# Patient Record
Sex: MALE | Race: WHITE | NOT HISPANIC OR LATINO | Employment: OTHER | URBAN - NONMETROPOLITAN AREA
[De-identification: names, ages, dates, MRNs, and addresses within clinical notes are randomized per-mention and may not be internally consistent; named-entity substitution may affect disease eponyms.]

---

## 2023-09-18 ENCOUNTER — OFFICE VISIT (OUTPATIENT)
Dept: NEUROSURGERY | Facility: CLINIC | Age: 73
End: 2023-09-18
Payer: MEDICARE

## 2023-09-18 ENCOUNTER — APPOINTMENT (OUTPATIENT)
Dept: OTHER | Facility: HOSPITAL | Age: 73
End: 2023-09-18
Payer: MEDICARE

## 2023-09-18 ENCOUNTER — HOSPITAL ENCOUNTER (OUTPATIENT)
Dept: GENERAL RADIOLOGY | Facility: HOSPITAL | Age: 73
Discharge: HOME | End: 2023-09-18
Payer: MEDICARE

## 2023-09-18 VITALS — BODY MASS INDEX: 43.52 KG/M2 | WEIGHT: 304 LBS | HEIGHT: 70 IN

## 2023-09-18 DIAGNOSIS — E66.01 MORBIDLY OBESE: ICD-10-CM

## 2023-09-18 DIAGNOSIS — F17.200 SMOKER: ICD-10-CM

## 2023-09-18 DIAGNOSIS — M51.36 LUMBAR DEGENERATIVE DISC DISEASE: Primary | ICD-10-CM

## 2023-09-18 PROBLEM — M51.369 LUMBAR DEGENERATIVE DISC DISEASE: Status: ACTIVE | Noted: 2023-09-18

## 2023-09-18 PROCEDURE — 1159F MED LIST DOCD IN RCRD: CPT | Performed by: PHYSICIAN ASSISTANT

## 2023-09-18 PROCEDURE — 1160F RVW MEDS BY RX/DR IN RCRD: CPT | Performed by: PHYSICIAN ASSISTANT

## 2023-09-18 PROCEDURE — 99204 OFFICE O/P NEW MOD 45 MIN: CPT | Performed by: PHYSICIAN ASSISTANT

## 2023-09-18 PROCEDURE — 72114 X-RAY EXAM L-S SPINE BENDING: CPT

## 2023-09-18 RX ORDER — PRENATAL VIT/IRON FUM/FOLIC AC 27MG-0.8MG
1 TABLET ORAL DAILY
COMMUNITY

## 2023-09-18 RX ORDER — POTASSIUM CHLORIDE 750 MG/1
10 TABLET, EXTENDED RELEASE ORAL DAILY
COMMUNITY

## 2023-09-18 RX ORDER — LIDOCAINE 50 MG/G
1 PATCH TOPICAL EVERY 24 HOURS
Qty: 30 PATCH | Refills: 0 | Status: SHIPPED | OUTPATIENT
Start: 2023-09-18

## 2023-09-18 RX ORDER — FUROSEMIDE 40 MG/1
40 TABLET ORAL 4 TIMES DAILY
COMMUNITY

## 2023-09-18 RX ORDER — PEN NEEDLE, DIABETIC 31 GX3/16"
NEEDLE, DISPOSABLE MISCELLANEOUS
COMMUNITY
Start: 2023-07-14

## 2023-09-18 RX ORDER — SILDENAFIL 100 MG/1
50 TABLET, FILM COATED ORAL AS NEEDED
COMMUNITY

## 2023-09-18 RX ORDER — TOLTERODINE 4 MG/1
4 CAPSULE, EXTENDED RELEASE ORAL DAILY
COMMUNITY
Start: 2023-05-02

## 2023-09-18 RX ORDER — ORAL SEMAGLUTIDE 14 MG/1
1 TABLET ORAL DAILY
COMMUNITY

## 2023-09-18 RX ORDER — ROSUVASTATIN CALCIUM 40 MG/1
40 TABLET, COATED ORAL DAILY
COMMUNITY

## 2023-09-18 RX ORDER — ALLOPURINOL 300 MG/1
300 TABLET ORAL DAILY
COMMUNITY

## 2023-09-18 RX ORDER — ACETAMINOPHEN 325 MG/1
650 TABLET ORAL EVERY 6 HOURS PRN
COMMUNITY

## 2023-09-18 RX ORDER — LANOLIN ALCOHOL/MO/W.PET/CERES
150 CREAM (GRAM) TOPICAL DAILY
COMMUNITY

## 2023-09-18 RX ORDER — GABAPENTIN 600 MG/1
600 TABLET ORAL 3 TIMES DAILY
COMMUNITY
Start: 2023-07-07

## 2023-09-18 RX ORDER — AMLODIPINE AND VALSARTAN 10; 320 MG/1; MG/1
1 TABLET ORAL DAILY
COMMUNITY

## 2023-09-18 RX ORDER — ORPHENADRINE CITRATE 100 MG/1
100 TABLET, EXTENDED RELEASE ORAL 2 TIMES DAILY PRN
COMMUNITY
Start: 2023-06-19

## 2023-09-18 RX ORDER — MELATONIN
1 DAILY
COMMUNITY

## 2023-09-18 RX ORDER — CARVEDILOL 6.25 MG/1
6.25 TABLET ORAL 2 TIMES DAILY WITH MEALS
COMMUNITY
Start: 2023-09-18

## 2023-09-18 RX ORDER — INSULIN LISPRO 100 [IU]/ML
INJECTION, SOLUTION INTRAVENOUS; SUBCUTANEOUS
COMMUNITY
Start: 2023-06-01

## 2023-09-18 RX ORDER — HYDROCODONE BITARTRATE AND ACETAMINOPHEN 10; 325 MG/1; MG/1
1 TABLET ORAL
COMMUNITY
Start: 2023-06-19

## 2023-09-18 NOTE — PATIENT INSTRUCTIONS
Recommend trial lumbar facet injections with possible ablation  Continue meds as current with trial Lidocaine patches

## 2023-09-18 NOTE — PROGRESS NOTES
"    Chief complaint:   Chief Complaint   Patient presents with    Back Pain     Low back pain saw Dr. Bashir about a year ago and he sent him to pain management       Subjective     HPI:   Ezra comes in today as a self-referral with complaints of chronic back pain.  His pain is located in the lower lumbar region and his pain level is associated with his activity level for that day.  He denies any radiating pain to the lower extremities as well as neurogenic claudication symptoms.  He does have longstanding paresthesias affecting both feet secondary to diabetic neuropathy.  He is established in pain management with Dr. Rodgers.  He is scheduled for an upcoming LESI.  Last LESI was 4 months ago and provided 2 months of symptom relief.  He indicates that during the winter months when he is less active, the injections typically last about 3 months.  He takes Norco very sparingly but does take gabapentin routinely and has found that CBD Gummies work quite well.  He has not had any recent physical therapy.  He denies loss of bowel and bladder control as well as saddle anesthesia.    Review of Systems     Past Medical History:   Diagnosis Date    Arthritis     DM (diabetes mellitus)     HTN (hypertension)     Hx-TIA (transient ischemic attack)     Kidney disease      Past Surgical History:   Procedure Laterality Date    BACK SURGERY  2009    [facility]    CHOLECYSTECTOMY      KNEE SURGERY       History reviewed. No pertinent family history.  Social History     Tobacco Use    Smoking status: Some Days     Types: Cigarettes, Cigars    Smokeless tobacco: Never   Substance Use Topics    Alcohol use: Yes     Comment: 1-2 drinks a week    Drug use: Never     (Not in a hospital admission)    Allergies:  Dulaglutide    Objective      Vital Signs  Ht 177.8 cm (70\")   Wt (!) 138 kg (304 lb)   BMI 43.62 kg/m²     Physical Exam  Constitutional:       Appearance: He is well-developed. He is obese.   HENT:      Head: Normocephalic. "   Eyes:      General: Lids are normal.      Conjunctiva/sclera: Conjunctivae normal.      Pupils: Pupils are equal, round, and reactive to light.   Pulmonary:      Effort: Pulmonary effort is normal.      Breath sounds: Normal breath sounds.   Musculoskeletal:         General: Tenderness (Bilateral knees.) present.      Cervical back: Normal range of motion.   Skin:     General: Skin is warm.   Neurological:      Mental Status: He is alert and oriented to person, place, and time.      GCS: GCS eye subscore is 4. GCS verbal subscore is 5. GCS motor subscore is 6.      Cranial Nerves: No cranial nerve deficit.      Sensory: No sensory deficit.      Motor: Motor strength is normal.      Deep Tendon Reflexes: Reflexes are normal and symmetric. Reflexes normal.   Psychiatric:         Speech: Speech normal.         Behavior: Behavior normal.         Thought Content: Thought content normal.       Neurologic Exam     Mental Status   Oriented to person, place, and time.   Speech: speech is normal   Level of consciousness: alert    Cranial Nerves     CN III, IV, VI   Pupils are equal, round, and reactive to light.    Motor Exam   Muscle bulk: normal  Overall muscle tone: normal    Strength   Strength 5/5 throughout.     Sensory Exam   Light touch normal.     Gait, Coordination, and Reflexes     Gait  Gait: non-neurologicGait is stable with cane.  Demonstrates start up stiffness with rising from seated position.     Imaging review: MRI of the lumbar spine from outside facility dated 8/23/2006 was reviewed and demonstrates chronic compression deformity of L1.  There is mild to moderate disc degeneration L5-S1 with prior laminectomy changes.  There is paracentral disc protrusion with facet ligament hypertrophy.  No significant central stenosis there is severe right and moderate left neuroforaminal stenosis at L5-S1.  No large disc herniations or central stenosis throughout.        Assessment/Plan: I reviewed MRI findings in  detail with the patient and his wife.  He does have disc degeneration at L5-S1 and severe neuroforaminal stenosis on the right but does not have any radicular pain.  Complains of axial back pain.  He is neurologically stable.  Before considering any kind of surgery, I would like for him to try lumbar facet injections with possible RFA.  He is established with Dr. Mathews so we will get him set up L4-S1 procedure bilaterally.  I would also like to initiate 6 weeks of physical therapy to focus on lumbar core strengthening and stretching.  We will initiate a trial of lidocaine patches to see if that will help with his pain.  Discussed the benefits of weight loss as well as avoidance of aggravating activities and protective body mechanics.  We will get plain images today on his way out to evaluate for any instability.  He will follow-up in approximately 4 months with Dr. Bashir.    I advised the patient to call and return sooner for new or worsening complaints of weakness, paresthesias, gait disturbances, or any additional concerns.  Treatment options discussed in detail with Ezra and the patient voiced understanding.  Mr. Bashir agrees with this plan of care.     Patient is a smoker. Smoking cessation classes given to the patient    The patient's Body mass index is 43.62 kg/m².. BMI is above normal parameters. Recommendations include: educational material    ADVANCED CARE PLANNING  Information on advance directives provided in AVS.    DESTINY Fall Risk Assessment was completed, and patient is at MODERATE risk for falls. Assessment completed on:9/18/2023       Diagnoses and all orders for this visit:    1. Lumbar degenerative disc disease (Primary)  -     XR Spine Lumbar Complete With Flex & Ext  -     Ambulatory Referral to Physical Therapy Evaluate and treat, Neuro; Strengthening, ROM, Stretching; Full weight bearing  -     lidocaine (LIDODERM) 5 %; Place 1 patch on the skin as directed by provider Daily. Remove &  Discard patch within 12 hours or as directed by MD  Dispense: 30 patch; Refill: 0  -     Ambulatory Referral to Pain Management Clinic    2. Smoker    3. Morbidly obese          I discussed the patients findings and my recommendations with patient    Maliha Peralta PA-C  09/18/23  13:46 CDT

## 2024-02-21 ENCOUNTER — OFFICE VISIT (OUTPATIENT)
Dept: NEUROSURGERY | Facility: CLINIC | Age: 74
End: 2024-02-21
Payer: MEDICARE

## 2024-02-21 VITALS — WEIGHT: 293 LBS | BODY MASS INDEX: 41.95 KG/M2 | HEIGHT: 70 IN

## 2024-02-21 DIAGNOSIS — E66.01 CLASS 3 SEVERE OBESITY DUE TO EXCESS CALORIES WITH SERIOUS COMORBIDITY AND BODY MASS INDEX (BMI) OF 40.0 TO 44.9 IN ADULT: ICD-10-CM

## 2024-02-21 DIAGNOSIS — M51.36 LUMBAR DEGENERATIVE DISC DISEASE: Primary | ICD-10-CM

## 2024-02-21 DIAGNOSIS — F17.200 SMOKER: ICD-10-CM

## 2024-02-21 NOTE — PROGRESS NOTES
"    Chief complaint:   Chief Complaint   Patient presents with    Follow-up     Follow up low back pain        Subjective     HPI: I had a chance to see Ezra today in follow-up.  He is doing very well as far as his back is concerned following his epidural steroid injection and he really has very minimal back pain at this time.  His main issue now is getting his knees done and he is currently working on losing some weight so that he can have the procedures done.    Review of Systems      Objective      Vital Signs  Ht 177.8 cm (70\")   Wt 133 kg (293 lb)   BMI 42.04 kg/m²     Physical Exam  Neurological:      Mental Status: He is oriented to person, place, and time.      Cranial Nerves: Cranial nerves 2-12 are intact.      Motor: Motor strength is normal.     Gait: Gait is intact.   Psychiatric:         Speech: Speech normal.         Neurologic Exam     Mental Status   Oriented to person, place, and time.   Attention: normal. Concentration: normal.   Speech: speech is normal   Level of consciousness: alert  Knowledge: good.   Normal comprehension.     Cranial Nerves   Cranial nerves II through XII intact.     Motor Exam     Strength   Strength 5/5 throughout.     Sensory Exam   Light touch normal.     Gait, Coordination, and Reflexes     Gait  Gait: normal      Imaging review: MRI of the lumbar spine does show mild degenerative changes throughout the lumbar spine except for L5/S1 which does show moderate to severe disc degeneration with some foraminal stenosis.        Assessment/Plan:   Lumbar degenerative disc disease, improved with epidural steroid injection  Bilateral severe knee arthritis    At this point I would like to see Ezra amanda in 3 months to check and see where he is at as far as his back is concerned and also to see how he is doing as far as his knees and where he is out on getting surgery.  I look forward to seeing him at his next visit.    Patient is a nonsmoker  The patient's Body mass index is " 42.04 kg/m².. BMI is above normal parameters. Recommendations include: continue with current weight loss program    Diagnoses and all orders for this visit:    1. Lumbar degenerative disc disease (Primary)    2. Class 3 severe obesity due to excess calories with serious comorbidity and body mass index (BMI) of 40.0 to 44.9 in adult    3. Smoker        I discussed the patients findings and my recommendations with patient  Macho Bashir DO  02/21/24  16:15 CST

## 2024-07-31 ENCOUNTER — OFFICE VISIT (OUTPATIENT)
Dept: NEUROSURGERY | Facility: CLINIC | Age: 74
End: 2024-07-31
Payer: MEDICARE

## 2024-07-31 VITALS — WEIGHT: 276.2 LBS | BODY MASS INDEX: 39.54 KG/M2 | HEIGHT: 70 IN

## 2024-07-31 DIAGNOSIS — E66.09 CLASS 2 OBESITY DUE TO EXCESS CALORIES WITHOUT SERIOUS COMORBIDITY WITH BODY MASS INDEX (BMI) OF 39.0 TO 39.9 IN ADULT: ICD-10-CM

## 2024-07-31 DIAGNOSIS — M51.36 LUMBAR DEGENERATIVE DISC DISEASE: Primary | ICD-10-CM

## 2024-07-31 DIAGNOSIS — F17.200 SMOKER: ICD-10-CM

## 2024-07-31 PROBLEM — E66.812 CLASS 2 OBESITY DUE TO EXCESS CALORIES WITHOUT SERIOUS COMORBIDITY WITH BODY MASS INDEX (BMI) OF 39.0 TO 39.9 IN ADULT: Status: ACTIVE | Noted: 2024-07-31

## 2024-07-31 PROCEDURE — 99214 OFFICE O/P EST MOD 30 MIN: CPT | Performed by: PHYSICIAN ASSISTANT

## 2024-07-31 PROCEDURE — 1160F RVW MEDS BY RX/DR IN RCRD: CPT | Performed by: PHYSICIAN ASSISTANT

## 2024-07-31 PROCEDURE — 1159F MED LIST DOCD IN RCRD: CPT | Performed by: PHYSICIAN ASSISTANT

## 2024-07-31 RX ORDER — INSULIN GLARGINE 100 [IU]/ML
INJECTION, SOLUTION SUBCUTANEOUS
COMMUNITY
Start: 2024-07-12

## 2024-07-31 RX ORDER — ASPIRIN 325 MG
325 TABLET, DELAYED RELEASE (ENTERIC COATED) ORAL DAILY
COMMUNITY
Start: 2024-06-16

## 2024-07-31 RX ORDER — OXYCODONE HYDROCHLORIDE AND ACETAMINOPHEN 5; 325 MG/1; MG/1
TABLET ORAL
COMMUNITY
Start: 2024-06-15

## 2024-07-31 RX ORDER — NATEGLINIDE 120 MG/1
1 TABLET ORAL
COMMUNITY
Start: 2024-05-03

## 2024-07-31 RX ORDER — LORAZEPAM 1 MG/1
TABLET ORAL
COMMUNITY

## 2024-07-31 RX ORDER — ALBUTEROL SULFATE 90 UG/1
AEROSOL, METERED RESPIRATORY (INHALATION)
COMMUNITY
Start: 2024-03-14

## 2024-07-31 RX ORDER — SEMAGLUTIDE 2.68 MG/ML
INJECTION, SOLUTION SUBCUTANEOUS
COMMUNITY
Start: 2024-07-12

## 2024-07-31 NOTE — PROGRESS NOTES
"    Chief complaint:   Chief Complaint   Patient presents with    Follow-up     Pt reports to office for follow up stated he is not having any issues with his back stated he had left knee surgery on June 14th and right is scheduled for September.        Subjective     HPI: Ezra comes in today for recheck.  He states that his back is doing great.  He has no back pain or radicular pain, focal weakness or paresthesias.  He states that he has done very well since lumbar RFA November 2023 Dr. Rodgers.  He had a very successful left knee replacement and continues in physical therapy.  He is scheduled for right knee replacement in September, Dr. Dieudonne Jefferson.    Review of Systems      Objective      Vital Signs  Ht 177.8 cm (70\")   Wt 125 kg (276 lb 3.2 oz)   BMI 39.63 kg/m²     Physical Exam  Constitutional:       Appearance: Normal appearance. He is well-developed.   HENT:      Head: Normocephalic.   Eyes:      Pupils: Pupils are equal, round, and reactive to light.   Cardiovascular:      Rate and Rhythm: Normal rate.   Pulmonary:      Effort: Pulmonary effort is normal.   Musculoskeletal:         General: Normal range of motion.      Cervical back: Normal range of motion.   Skin:     General: Skin is warm and dry.   Neurological:      Mental Status: He is alert and oriented to person, place, and time.      GCS: GCS eye subscore is 4. GCS verbal subscore is 5. GCS motor subscore is 6.      Cranial Nerves: No cranial nerve deficit.      Sensory: No sensory deficit.      Motor: No weakness.      Gait: Gait normal.      Deep Tendon Reflexes: Reflexes are normal and symmetric.   Psychiatric:         Speech: Speech normal.         Behavior: Behavior normal.         Thought Content: Thought content normal.         Neurologic Exam     Mental Status   Oriented to person, place, and time.   Speech: speech is normal   Level of consciousness: alert  Knowledge: good.     Cranial Nerves     CN III, IV, VI   Pupils are equal, round, " and reactive to light.    Motor Exam   Muscle bulk: normal  Overall muscle tone: normalNo EHL weakness     Sensory Exam   Light touch normal.     Gait, Coordination, and Reflexes Gait is mildly antalgic, favoring the right lower extremity.  Steady with simple cane       Imaging review: Most recent MRI of the lumbar spine was reviewed and demonstrates moderate disc degeneration L5-S1 with mild disc degeneration adjacent levels.  There is chronic compression deformity of L1.  There is severe right neuroforaminal stenosis at L5-S1.        Assessment/Plan: I reviewed images in detail with Ezra and his wife.  He has moderate disc degeneration L5-S1 with severe neuroforaminal stenosis on the right.  Currently has no back pain or radicular pain to speak of.  I recommend he keep at the weight loss and avoid aggravating activities and utilize protective body mechanics.    He will follow-up in the office in about 6 months with me to make sure he is still doing okay.  If he has any increased pain, radicular pain, focal weakness or other issues or concerns he can call for sooner appointment.  I look forward to seeing him at his next visit.      Patient is a smoker.  Smoking cessation material was provided.    The patient's Body mass index is 39.63 kg/m².. BMI is above normal parameters. Recommendations include: educational material    ADVANCED CARE PLANNING  Information on advance directives provided in AVS.    DESTINY Fall Risk Assessment was completed, and patient is at LOW risk for falls.Assessment completed on:2/21/2024     Diagnoses and all orders for this visit:    1. Lumbar degenerative disc disease (Primary)    2. Smoker    3. Class 2 obesity due to excess calories without serious comorbidity with body mass index (BMI) of 39.0 to 39.9 in adult    I spent 30 minutes caring for Ezra on this date of service. This time includes time spent by me in the following activities: preparing for the visit, reviewing tests,  obtaining and/or reviewing a separately obtained history, performing a medically appropriate examination and/or evaluation, counseling and educating the patient/family/caregiver, referring and communicating with other health care professionals, documenting information in the medical record, independently interpreting results and communicating that information with the patient/family/caregiver, and care coordination.      I discussed the patients findings and my recommendations with patient  Maliha Peralta PA-C  07/31/24  15:20 CDT

## 2025-01-27 ENCOUNTER — OFFICE VISIT (OUTPATIENT)
Dept: NEUROSURGERY | Facility: CLINIC | Age: 75
End: 2025-01-27
Payer: MEDICARE

## 2025-01-27 VITALS — WEIGHT: 277.2 LBS | HEIGHT: 70 IN | BODY MASS INDEX: 39.69 KG/M2

## 2025-01-27 DIAGNOSIS — M51.360 DEGENERATION OF INTERVERTEBRAL DISC OF LUMBAR REGION WITH DISCOGENIC BACK PAIN: Primary | ICD-10-CM

## 2025-01-27 DIAGNOSIS — E66.09 CLASS 2 OBESITY DUE TO EXCESS CALORIES WITHOUT SERIOUS COMORBIDITY WITH BODY MASS INDEX (BMI) OF 39.0 TO 39.9 IN ADULT: ICD-10-CM

## 2025-01-27 DIAGNOSIS — E66.812 CLASS 2 OBESITY DUE TO EXCESS CALORIES WITHOUT SERIOUS COMORBIDITY WITH BODY MASS INDEX (BMI) OF 39.0 TO 39.9 IN ADULT: ICD-10-CM

## 2025-01-27 DIAGNOSIS — Z78.9 NONSMOKER: ICD-10-CM

## 2025-01-27 PROCEDURE — 1160F RVW MEDS BY RX/DR IN RCRD: CPT | Performed by: PHYSICIAN ASSISTANT

## 2025-01-27 PROCEDURE — 99213 OFFICE O/P EST LOW 20 MIN: CPT | Performed by: PHYSICIAN ASSISTANT

## 2025-01-27 PROCEDURE — 1159F MED LIST DOCD IN RCRD: CPT | Performed by: PHYSICIAN ASSISTANT

## 2025-01-27 RX ORDER — ASPIRIN 81 MG/1
81 TABLET ORAL DAILY
COMMUNITY

## 2025-01-27 RX ORDER — CYANOCOBALAMIN 1000 UG/ML
1000 INJECTION, SOLUTION INTRAMUSCULAR; SUBCUTANEOUS
COMMUNITY
Start: 2024-12-17

## 2025-01-27 RX ORDER — AMOXICILLIN 500 MG/1
CAPSULE ORAL
COMMUNITY

## 2025-01-27 RX ORDER — OMEGA-3 FATTY ACIDS/FISH OIL 300-500 MG
2000 CAPSULE ORAL
COMMUNITY

## 2025-01-27 RX ORDER — POLYETHYLENE GLYCOL-3350 AND ELECTROLYTES 236; 6.74; 5.86; 2.97; 22.74 G/274.31G; G/274.31G; G/274.31G; G/274.31G; G/274.31G
4000 POWDER, FOR SOLUTION ORAL DAILY
COMMUNITY
Start: 2024-12-15

## 2025-01-27 NOTE — PROGRESS NOTES
"    Chief complaint:   Chief Complaint   Patient presents with    Follow-up     Pt reports to office for 6 month follow up stated he is doing well. No pain        Subjective     HPI: Ezra comes in today for recheck.  He is having no back pain whatsoever.  He further denies radicular pain.  He indicates that since he has had both of his knees replaced the his back pain has improved dramatically.  He denies any focal weakness, neurogenic claudication symptoms and paresthesias.    Review of Systems      Objective      Vital Signs  Ht 177.8 cm (70\")   Wt 126 kg (277 lb 3.2 oz)   BMI 39.77 kg/m²     Physical Exam  Constitutional:       Appearance: Normal appearance. He is well-developed.   HENT:      Head: Normocephalic.   Eyes:      General: Lids are normal.      Conjunctiva/sclera: Conjunctivae normal.      Pupils: Pupils are equal, round, and reactive to light.   Cardiovascular:      Rate and Rhythm: Normal rate.   Pulmonary:      Effort: Pulmonary effort is normal.      Breath sounds: Normal breath sounds.   Musculoskeletal:         General: Normal range of motion.      Cervical back: Normal range of motion.   Skin:     General: Skin is warm and dry.   Neurological:      Mental Status: He is oriented to person, place, and time.      GCS: GCS eye subscore is 4. GCS verbal subscore is 5. GCS motor subscore is 6.      Sensory: No sensory deficit.      Motor: Motor strength is normal.No weakness.      Gait: Gait normal.      Deep Tendon Reflexes: Reflexes are normal and symmetric. Reflexes normal.   Psychiatric:         Speech: Speech normal.         Behavior: Behavior normal.         Thought Content: Thought content normal.         Neurological Exam  Mental Status  Awake, alert and oriented to person, place and time. Oriented to person, place, and time. Speech is normal.    Cranial Nerves  CN III, IV, VI: Normal lids and orbits bilaterally. Pupils equal round and reactive to light bilaterally.    Motor  Normal muscle " bulk throughout. Strength is 5/5 throughout all four extremities.    Sensory  Sensation is intact to light touch, pinprick, vibration and proprioception in all four extremities.    Reflexes  Deep tendon reflexes are 2+ and symmetric in all four extremities.    Gait   Normal gait.Casual gait is normal including stance, stride, and arm swing.       Imaging review: No recent imaging previous lumbar films dated September 2023.  Including flexion-extension were reviewed and demonstrate moderate to severe disc degeneration L5-S1.  There is chronic compression deformity of L1.  No anterior listhesis or acute findings.    MRI of the lumbar spine dated August 2023 was reviewed and demonstrates disc degeneration L5-S1 with severe right neuroforaminal stenosis.  Chronic L1 compression deformity is redemonstrated.  No high-grade central stenosis throughout.      Assessment/Plan: Ezra has no back pain or radicular pain to speak of since since he got his knee issues addressed.  He is neurologically stable.    I think we can hold off on repeat imaging at this point.  I will give him a 6-month follow-up with Dr. Bashir.  If he is doing well, he is okay to cancel that appointment and follow-up with us on an as-needed basis.    Patient is a nonsmoker    The patient's Body mass index is 39.77 kg/m².. BMI is above normal parameters. Recommendations include: educational material    ADVANCED CARE PLANNING  Information on advance directives provided in AVS.    LOUISADI Fall Risk Assessment has not been completed.     Diagnoses and all orders for this visit:    1. Degeneration of intervertebral disc of lumbar region with discogenic back pain (Primary)    2. Nonsmoker    3. Class 2 obesity due to excess calories without serious comorbidity with body mass index (BMI) of 39.0 to 39.9 in adult    I spent 20 minutes caring for Ezra on this date of service. This time includes time spent by me in the following activities: preparing for the visit,  reviewing tests, obtaining and/or reviewing a separately obtained history, performing a medically appropriate examination and/or evaluation, counseling and educating the patient/family/caregiver, referring and communicating with other health care professionals, documenting information in the medical record, independently interpreting results and communicating that information with the patient/family/caregiver, and care coordination.      I discussed the patients findings and my recommendations with patient  Maliha Peralta PA-C  01/27/25  15:31 CST
